# Patient Record
Sex: FEMALE | Race: WHITE
[De-identification: names, ages, dates, MRNs, and addresses within clinical notes are randomized per-mention and may not be internally consistent; named-entity substitution may affect disease eponyms.]

---

## 2021-06-19 ENCOUNTER — HOSPITAL ENCOUNTER (OUTPATIENT)
Dept: HOSPITAL 53 - M LABSMTC | Age: 41
End: 2021-06-19
Attending: ANESTHESIOLOGY

## 2021-06-19 DIAGNOSIS — Z01.812: Primary | ICD-10-CM

## 2021-06-24 ENCOUNTER — HOSPITAL ENCOUNTER (OUTPATIENT)
Dept: HOSPITAL 53 - M SDC | Age: 41
LOS: 1 days | Discharge: HOME | End: 2021-06-25
Attending: UROLOGY
Payer: COMMERCIAL

## 2021-06-24 VITALS — HEIGHT: 66 IN | BODY MASS INDEX: 36.03 KG/M2 | WEIGHT: 224.21 LBS

## 2021-06-24 VITALS — SYSTOLIC BLOOD PRESSURE: 130 MMHG | DIASTOLIC BLOOD PRESSURE: 72 MMHG

## 2021-06-24 VITALS — SYSTOLIC BLOOD PRESSURE: 120 MMHG | DIASTOLIC BLOOD PRESSURE: 65 MMHG

## 2021-06-24 VITALS — SYSTOLIC BLOOD PRESSURE: 130 MMHG | DIASTOLIC BLOOD PRESSURE: 67 MMHG

## 2021-06-24 VITALS — SYSTOLIC BLOOD PRESSURE: 139 MMHG | DIASTOLIC BLOOD PRESSURE: 71 MMHG

## 2021-06-24 VITALS — DIASTOLIC BLOOD PRESSURE: 74 MMHG | SYSTOLIC BLOOD PRESSURE: 127 MMHG

## 2021-06-24 DIAGNOSIS — N81.9: Primary | ICD-10-CM

## 2021-06-24 DIAGNOSIS — K21.9: ICD-10-CM

## 2021-06-24 DIAGNOSIS — Z79.899: ICD-10-CM

## 2021-06-24 LAB
B-HCG SERPL QL: NEGATIVE
HCT VFR BLD AUTO: 44.5 % (ref 36–47)
HGB BLD-MCNC: 14.9 G/DL (ref 12–15.5)
MCH RBC QN AUTO: 29.7 PG (ref 27–33)
MCHC RBC AUTO-ENTMCNC: 33.5 G/DL (ref 32–36.5)
MCV RBC AUTO: 88.6 FL (ref 80–96)
PLATELET # BLD AUTO: 355 10^3/UL (ref 150–450)
RBC # BLD AUTO: 5.02 10^6/UL (ref 4–5.4)
WBC # BLD AUTO: 10 10^3/UL (ref 4–10)

## 2021-06-24 PROCEDURE — 86850 RBC ANTIBODY SCREEN: CPT

## 2021-06-24 PROCEDURE — 84703 CHORIONIC GONADOTROPIN ASSAY: CPT

## 2021-06-24 PROCEDURE — 88307 TISSUE EXAM BY PATHOLOGIST: CPT

## 2021-06-24 PROCEDURE — 57282 COLPOPEXY EXTRAPERITONEAL: CPT

## 2021-06-24 PROCEDURE — 57260 CMBN ANT PST COLPRHY: CPT

## 2021-06-24 PROCEDURE — 86901 BLOOD TYPING SEROLOGIC RH(D): CPT

## 2021-06-24 PROCEDURE — 96361 HYDRATE IV INFUSION ADD-ON: CPT

## 2021-06-24 PROCEDURE — 96360 HYDRATION IV INFUSION INIT: CPT

## 2021-06-24 PROCEDURE — 86900 BLOOD TYPING SEROLOGIC ABO: CPT

## 2021-06-24 PROCEDURE — 85027 COMPLETE CBC AUTOMATED: CPT

## 2021-06-24 PROCEDURE — 58262 VAG HYST INCLUDING T/O: CPT

## 2021-06-24 PROCEDURE — 36415 COLL VENOUS BLD VENIPUNCTURE: CPT

## 2021-06-24 RX ADMIN — FENTANYL CITRATE PRN MCG: 50 INJECTION, SOLUTION INTRAMUSCULAR; INTRAVENOUS at 13:05

## 2021-06-24 RX ADMIN — FENTANYL CITRATE PRN MCG: 50 INJECTION, SOLUTION INTRAMUSCULAR; INTRAVENOUS at 10:50

## 2021-06-24 RX ADMIN — SODIUM CHLORIDE, POTASSIUM CHLORIDE, SODIUM LACTATE AND CALCIUM CHLORIDE SCH MLS/HR: 600; 310; 30; 20 INJECTION, SOLUTION INTRAVENOUS at 13:15

## 2021-06-24 RX ADMIN — FENTANYL CITRATE PRN MCG: 50 INJECTION, SOLUTION INTRAMUSCULAR; INTRAVENOUS at 10:40

## 2021-06-24 RX ADMIN — SODIUM CHLORIDE, POTASSIUM CHLORIDE, SODIUM LACTATE AND CALCIUM CHLORIDE SCH MLS/HR: 600; 310; 30; 20 INJECTION, SOLUTION INTRAVENOUS at 20:39

## 2021-06-24 RX ADMIN — CEFAZOLIN SODIUM ONE MLS/HR: 2 SOLUTION INTRAVENOUS at 07:45

## 2021-06-24 NOTE — RO
OPERATIVE NOTE



DATE OF OPERATION: 06/24/2021



PREOPERATIVE DIAGNOSIS/INDICATIONS FOR SURGERY: Symptomatic pelvic prolapse.



POSTOPERATIVE DIAGNOSIS: Symptomatic pelvic prolapse.



PROCEDURE: Total vaginal hysterectomy with left salpingectomy  she retains the

distal portion of her right tube and both ovaries; she had sacrospinous

suspension with anterior and posterior repair and cystourethroscopy.



SURGEON: Marylu Rice MD



ASSISTANT: None.



ANESTHESIA: General endotracheal anesthesia.



BRIEF DESCRIPTION OF PROCEDURE AND FINDINGS: Evy was brought to the operating

room where sufficient general endotracheal anesthesia was induced and she was

prepped, draped and positioned in usual sterile fashion. Bladder was emptied

and the anterior and posterior aspects of the cervix grasped with single tooth

tenacula. Circumferential incision was made around the base of the cervix and

the cardinal ligaments were then isolated, clamped, transected and ligated

using Julio Hess clamps which were used throughout this portion of the case and

#0 Vicryl suture. Carefully isolated, clamped, transected and ligated the

uterosacral ligaments which were held for later reattachment to the cuff. We

had entered posteriorly and the dissection then carried around anteriorly to

displace the bladder and then the uterine vasculature was carefully clamped,

transected and ligated in sequential fashion along ;the lateral aspect of the

uterus which was then delivered. The right tube was scarred against the ovary

and in this young woman I did not want to disrupt her ovarian function so I

could not get the fimbria on the right side, the proximal stump of the tube, on

the left side though we were able to get fimbria without disrupting the ovary.

Both ovaries were normal. I closed the peritoneum and secured the angles and

reconnected the uterosacrals. I then did the dissection for the superior aspect

of the anterior repair and oversewed that with 2-0 Vicryl. I then continued the

dissection of the vaginal tissues away from the underlying tissues and

dissected out to the patient's right side for the sacrospinous suspension and

having isolated the sacrospinous ligament we then placed two Anchorsure anchors

into the sacrospinous ligament, each of those anchors carried two 2-0 Maxon

prolonged absorbable sutures. These were brought out in four-point suspension

of the vaginal cuff in typical fashion. We then closed the cuff before bringing

those up, we brought them up under one throw, holding them under tension and

cystourethroscopy confirmed normal jets of urine bilaterally. We were able to

complete the throws on those sutures. Having completed the anterior repair and

support of the vaginal cuff we then turned our attention to the perineal repair

and posterior repair. She did still have some laxity there although the

anterior correction was good. We certainly do not want to create vaginal

shorting of any excess amount in this only 40-year-old woman. We did remove an

inverted triangle of tissue at the perineum and then dissected posteriorly,

re-supporting the rectovaginal septal defects and then re-supporting the

rectovaginal septum to the perineal body and re-supporting the perineal body

and then closing the vaginal tissues over this. A small amount of redundant

tissue was removed but not much, again I did not want to overcorrect in this

young woman. We confirmed lack of injury to the bowel or bladder and the

procedure was ended.



ESTIMATED BLOOD LOSS: For the procedure about 30 mL.



FLUIDS: Crystalloid.



COMPLICATIONS: None.



CONDITION AND DISPOSITION: Evy tolerated the procedure well and was

recovering in the recovery room in good condition.

## 2021-06-25 VITALS — SYSTOLIC BLOOD PRESSURE: 120 MMHG | DIASTOLIC BLOOD PRESSURE: 62 MMHG

## 2021-06-25 VITALS — SYSTOLIC BLOOD PRESSURE: 129 MMHG | DIASTOLIC BLOOD PRESSURE: 63 MMHG

## 2021-06-25 LAB
HCT VFR BLD AUTO: 40 % (ref 36–47)
HGB BLD-MCNC: 13.2 G/DL (ref 12–15.5)
MCH RBC QN AUTO: 29.7 PG (ref 27–33)
MCHC RBC AUTO-ENTMCNC: 33 G/DL (ref 32–36.5)
MCV RBC AUTO: 89.9 FL (ref 80–96)
PLATELET # BLD AUTO: 318 10^3/UL (ref 150–450)
RBC # BLD AUTO: 4.45 10^6/UL (ref 4–5.4)
WBC # BLD AUTO: 13.9 10^3/UL (ref 4–10)

## 2021-06-25 RX ADMIN — SODIUM CHLORIDE, POTASSIUM CHLORIDE, SODIUM LACTATE AND CALCIUM CHLORIDE SCH MLS/HR: 600; 310; 30; 20 INJECTION, SOLUTION INTRAVENOUS at 04:39

## 2023-03-17 ENCOUNTER — HOSPITAL ENCOUNTER (OUTPATIENT)
Dept: HOSPITAL 53 - M PLALAB | Age: 43
End: 2023-03-17
Attending: ADVANCED PRACTICE MIDWIFE
Payer: COMMERCIAL

## 2023-03-17 DIAGNOSIS — Z12.4: Primary | ICD-10-CM

## 2023-03-17 LAB
HBV SURFACE AB SER-ACNC: NEGATIVE M[IU]/ML
HCV AB SER QL: 0 INDEX (ref ?–0.8)
HIV 1+2 AB+HIV1 P24 AG SERPL QL IA: NEGATIVE
N GONORRHOEA RRNA SPEC QL NAA+PROBE: NEGATIVE